# Patient Record
Sex: FEMALE | Race: WHITE | NOT HISPANIC OR LATINO | Employment: FULL TIME | URBAN - METROPOLITAN AREA
[De-identification: names, ages, dates, MRNs, and addresses within clinical notes are randomized per-mention and may not be internally consistent; named-entity substitution may affect disease eponyms.]

---

## 2007-07-02 LAB — EXTERNAL HIV SCREEN: NORMAL

## 2018-09-21 ENCOUNTER — TELEPHONE (OUTPATIENT)
Dept: FAMILY MEDICINE CLINIC | Facility: CLINIC | Age: 48
End: 2018-09-21

## 2018-09-21 NOTE — TELEPHONE ENCOUNTER
We are listed as pt's PCP  Pt has not been seen since 2013  LM to please call back and verify who is her PCP?

## 2019-02-22 ENCOUNTER — TRANSITIONAL CARE MANAGEMENT (OUTPATIENT)
Dept: FAMILY MEDICINE CLINIC | Facility: CLINIC | Age: 49
End: 2019-02-22

## 2019-02-22 RX ORDER — APREPITANT 125 MG/1
125 CAPSULE ORAL
COMMUNITY
End: 2019-02-25

## 2019-02-22 RX ORDER — ONDANSETRON HYDROCHLORIDE 8 MG/1
8 TABLET, FILM COATED ORAL EVERY 8 HOURS PRN
COMMUNITY
Start: 2018-09-25 | End: 2019-02-25 | Stop reason: ALTCHOICE

## 2019-02-22 RX ORDER — LORATADINE 10 MG/1
10 TABLET ORAL AS NEEDED
COMMUNITY
End: 2019-02-25

## 2019-02-22 RX ORDER — DEXAMETHASONE 4 MG/1
TABLET ORAL
COMMUNITY
Start: 2019-01-24 | End: 2019-02-25

## 2019-02-22 RX ORDER — LIDOCAINE AND PRILOCAINE 25; 25 MG/G; MG/G
CREAM TOPICAL
COMMUNITY
Start: 2018-09-26

## 2019-02-22 RX ORDER — DIPHENOXYLATE HYDROCHLORIDE AND ATROPINE SULFATE 2.5; .025 MG/1; MG/1
1 TABLET ORAL 4 TIMES DAILY PRN
COMMUNITY
Start: 2018-11-12 | End: 2019-02-25 | Stop reason: ALTCHOICE

## 2019-02-22 RX ORDER — OMEPRAZOLE 20 MG/1
20 CAPSULE, DELAYED RELEASE ORAL AS NEEDED
COMMUNITY
End: 2019-02-25 | Stop reason: ALTCHOICE

## 2019-02-22 RX ORDER — APREPITANT 125MG-80MG
125 KIT ORAL
COMMUNITY
Start: 2019-01-24 | End: 2019-02-25

## 2019-02-22 RX ORDER — TRAMADOL HYDROCHLORIDE 50 MG/1
50 TABLET ORAL EVERY 6 HOURS PRN
COMMUNITY
Start: 2019-02-20 | End: 2020-02-20

## 2019-02-22 RX ORDER — LORAZEPAM 0.5 MG/1
.5-1 TABLET ORAL EVERY 6 HOURS PRN
COMMUNITY
Start: 2018-09-25

## 2019-02-22 RX ORDER — PROCHLORPERAZINE MALEATE 10 MG
10 TABLET ORAL EVERY 6 HOURS PRN
COMMUNITY
End: 2019-02-25 | Stop reason: ALTCHOICE

## 2019-02-22 RX ORDER — POTASSIUM CHLORIDE 20 MEQ/1
20 TABLET, EXTENDED RELEASE ORAL
COMMUNITY
Start: 2019-01-21 | End: 2019-02-25

## 2019-02-25 ENCOUNTER — OFFICE VISIT (OUTPATIENT)
Dept: FAMILY MEDICINE CLINIC | Facility: CLINIC | Age: 49
End: 2019-02-25
Payer: COMMERCIAL

## 2019-02-25 VITALS
RESPIRATION RATE: 16 BRPM | HEART RATE: 88 BPM | TEMPERATURE: 98.2 F | BODY MASS INDEX: 29.81 KG/M2 | DIASTOLIC BLOOD PRESSURE: 88 MMHG | WEIGHT: 162 LBS | HEIGHT: 62 IN | SYSTOLIC BLOOD PRESSURE: 138 MMHG

## 2019-02-25 DIAGNOSIS — C50.812 MALIGNANT NEOPLASM OF OVERLAPPING SITES OF LEFT BREAST IN FEMALE, ESTROGEN RECEPTOR POSITIVE (HCC): Primary | ICD-10-CM

## 2019-02-25 DIAGNOSIS — E87.6 HYPOKALEMIA: ICD-10-CM

## 2019-02-25 DIAGNOSIS — Z17.0 MALIGNANT NEOPLASM OF OVERLAPPING SITES OF LEFT BREAST IN FEMALE, ESTROGEN RECEPTOR POSITIVE (HCC): Primary | ICD-10-CM

## 2019-02-25 DIAGNOSIS — R00.0 TACHYCARDIA: ICD-10-CM

## 2019-02-25 DIAGNOSIS — Z12.11 SCREENING FOR COLON CANCER: ICD-10-CM

## 2019-02-25 PROCEDURE — 99496 TRANSJ CARE MGMT HIGH F2F 7D: CPT | Performed by: FAMILY MEDICINE

## 2019-02-25 NOTE — PROGRESS NOTES
FAMILY PRACTICE OFFICE VISIT       NAME: Margie Reyes  AGE: 46 y o  SEX: female       : 1970        MRN: [id number]        Assessment and Plan     Problem List Items Addressed This Visit        Other    Malignant neoplasm of overlapping sites of left breast in female, estrogen receptor positive ([facility] [address] ) - Primary      Other Visit Diagnoses     Screening for colon cancer        Relevant Orders    Ambulatory referral to Gastroenterology    Tachycardia        Relevant Orders    TSH, 3rd generation with Free T4 reflex    Holter monitor - 24 hour    Basic metabolic panel    Hypokalemia        Relevant Orders    Basic metabolic panel       Patient presents for follow-up after recent hospitalization due to diagnosis of of invasive left breast carcinoma  Status post left breast lumpectomy  She is recovering well after surgery  Pain is well controlled  Patient remains under ongoing care of [facility] and Surgical Oncology  She is currently on regimen of Herceptin and Perjeta  Echocardiogram performed in 2018 reveals normal ejection fraction  Patient is tachycardic, sinus tachycardia noted during last few office visit notes with heme Oncology  Her symptoms are likely related to possible side effects of chemotherapy/dehydration  She was noted to be hypokalemic on a few occasions  I recommended to proceed with repeat BMP and check TSH/free T4  Will proceed with Holter monitor  Patient should schedule Pneumovax, she prefers to hold of vaccination today but will set up with the nurse separately after discussing with her oncologist   Patient is status post complete hysterectomy/salpingectomy, ovaries preserved  She remains under care of gyn  She is past due colonoscopy, I advised her to set up within next few months  Patient will continue routine follow-ups with Hematology Oncology    She will follow up with PCP on a as needed basis for now, I advised patient to schedule annual well exams  There are no Patient Instructions on file for this visit  M*Modal software was used to dictate this note  It may contain errors with dictating incorrect words/spelling  Please contact provider directly with any questions  Chief Complaint     Chief Complaint   Patient presents with    Transition of Care Management     Left Lumpectomy        History of Present Illness     Patient presents for follow-up after recent hospitalization for left breast lumpectomy due to recent diagnosis of breast cancer  I have not seen her in the office since 12/2013   Left  Breast  Cancer  Dxed in 9/2018  After routine annual mammography    Patient has  Been f/up  With  GYN regularly, Dr Lewis  S/p hysterectomy -ovaries preserved due to fibroid over a year ago  No FHX of breast CA       Dxed and is being treated  for breast cancer at [facility]    Dr Juarez/Hematology Oncology   Dr Padron/Surgical Oncology   patient  has completed  Chemotherapy x 6    s/p recent Left breast lumpectomy        No significant pain now,  Not using any pain RX    Will be starting  Radiation after surgery   Patient reports discussion  RE: possible complete mastectomy if margins are not clear    colonsocpy  6/2014-  DR Joselyn Luo;  Due in 3 years -  never  Proceeded    Current medications include Herceptin/ Perjeta    Monitored with TTE as per hem- oncology    last TTE  1/2019 , reportedly  - normal   Patient denies chest pain, palpitations, shortness of breath or dizziness  Reviewed of medical records indicates recurrent hypokalemia with potassium level of 2 7 on January 21st and hemoglobin of 7 9  On February 15th, patient's fasting blood sugar was 90, creatinine 0 72, potassium 2 7, rest of CMP was normal   Hemoglobin went up to 8 6 and platelet count was 201  Echo January 2019:  Technically difficult study     Normal left ventricular chamber size  Normal left ventricular systolic      function  Normal regional wall motion   Mild concentric left ventricular      hypertrophy   Estimated left ventricular ejection fraction is 55-60%      Normal left ventricular chamber size  Normal left ventricular systolic      function  Normal regional wall motion  Mild concentric left ventricular      hypertrophy   Estimated left ventricular ejection fraction is 55-60%      Normal right ventricular size and function       Mild mitral regurgitation       Normal diastolic function        Normal pericardium without effusion      Visually Estimated LV Ejection Fraction is:55%      TCM Call (since 1/27/2019)     Date and time call was made  2/22/2019  2:13 PM    Hospital care reviewed  Records reviewed    Patient was hospitialized at  Other (comment)    [address] [place] [address]    Date of Admission  02/20/19    Date of discharge  02/20/19    Diagnosis  Left Breast Lumpectomy    Disposition  Home    Were the patients medications reviewed and updated  No    Current Symptoms  Incisional pain    Incisional pain severity  Mild    Incisional pain onset  Ongoing      TCM Call (since 1/27/2019)     Post hospital issues  Reduced activity    Should patient be enrolled in anticoag monitoring? No    Scheduled for follow up?   Yes    Did you obtain your prescribed medications  Yes    Do you need help managing your prescriptions or medications  No    Is transportation to your appointment needed  No    I have advised the patient to call PCP with any new or worsening symptoms    Savannah Fang LPN    Living Arrangements  Spouse or Significiant other    Support System  Spouse    The type of support provided  Physical; Emotional    Do you have social support  Yes, as much as I need    Are you recieving any outpatient services  No    Are you recieving home care services  No    Are you using any community resources  No    Current waiver services  No    Have you fallen in the last 12 months  No    Interperter language line needed  No    Counseling  Patient    Counseling topics instructions for management; Importance of RX   compliance    Comments  Appt with Dr Ivana Napier 2/25/19 @ 1pm            Review of Systems   Review of Systems   Constitutional: Positive for fatigue  HENT: Negative  Eyes: Negative  Respiratory: Negative  Cardiovascular: Negative  Gastrointestinal: Negative  Endocrine: Negative  Genitourinary: Negative  Musculoskeletal: Negative  Allergic/Immunologic: Negative  Neurological: Negative  Hematological: Negative  Psychiatric/Behavioral: Negative          Active Problem List     Patient Active Problem List   Diagnosis    Malignant neoplasm of overlapping sites of left breast in female, estrogen receptor positive ([address] )       Past Medical History:  Past Medical History:   Diagnosis Date    Lyme disease        Past Surgical History:  Past Surgical History:   Procedure Laterality Date    COLONOSCOPY W/ POLYPECTOMY  2014    Dr Taylor    HYSTERECTOMY      SALPINGECTOMY Bilateral 01/2018    Dr Lewis, [facility]       Family History:  Family History   Problem Relation Age of Onset    Thyroid disease Mother     Osteoporosis Mother     Hypertension Father     Stroke Father     Hypertension Paternal Grandmother     Coronary artery disease Paternal Grandmother        Social History:  Social History     Socioeconomic History    Marital status: /Civil Union     Spouse name: Not on file    Number of children: Not on file    Years of education: Not on file    Highest education level: Not on file   Occupational History    Not on file   Social Needs    Financial resource strain: Not on file    Food insecurity:     Worry: Not on file     Inability: Not on file    Transportation needs:     Medical: Not on file     Non-medical: Not on file   Tobacco Use    Smoking status: Never Smoker    Smokeless tobacco: Never Used   Substance and Sexual Activity    Alcohol use: Yes     Frequency: Monthly or less    Drug use: Never    Sexual activity: Not on file   Lifestyle    Physical activity:     Days per week: Not on file     Minutes per session: Not on file    Stress: Not on file   Relationships    Social connections:     Talks on phone: Not on file     Gets together: Not on file     Attends Congregational service: Not on file     Active member of club or organization: Not on file     Attends meetings of clubs or organizations: Not on file     Relationship status: Not on file    Intimate partner violence:     Fear of current or ex partner: Not on file     Emotionally abused: Not on file     Physically abused: Not on file     Forced sexual activity: Not on file   Other Topics Concern    Not on file   Social History Narrative    Not on file       Objective     Vitals:    02/25/19 1310   BP: 138/88   BP Location: Right arm   Patient Position: Sitting   Cuff Size: Adult   Pulse: 88   Resp: 16   Temp: 98 2 °F (36 8 °C)   TempSrc: Tympanic   Weight: 73 5 kg (162 lb)   Height: 5' 2" (1 575 m)     Wt Readings from Last 3 Encounters:   02/25/19 73 5 kg (162 lb)   12/02/13 68 9 kg (152 lb)       Physical Exam   Constitutional: She is oriented to person, place, and time  She appears well-developed and well-nourished  Fatigued   HENT:   Head: Normocephalic and atraumatic  Eyes: Conjunctivae are normal    Neck: Neck supple  No JVD present  Carotid bruit is not present  No thyromegaly present  Cardiovascular: Regular rhythm and normal heart sounds  Tachycardia present  No murmur heard  Pulmonary/Chest: Effort normal and breath sounds normal  No respiratory distress  She has no wheezes  Abdominal: Soft  Bowel sounds are normal  She exhibits no distension and no abdominal bruit  There is no tenderness  There is no guarding  Musculoskeletal: Normal range of motion  She exhibits edema (trace)  Neurological: She is alert and oriented to person, place, and time  No cranial nerve deficit   Coordination normal    Psychiatric: She has a normal mood and affect  Her behavior is normal    Nursing note and vitals reviewed  [place] drain left breast/axillary area in place, wrapped, no surrounding edema or erythema    Pertinent Laboratory/Diagnostic Studies:  No results found for: GLUCOSE, BUN, CREATININE, CALCIUM, NA, K, CO2, CL  No results found for: ALT, AST, GGT, ALKPHOS, BILITOT    No results found for: WBC, HGB, HCT, MCV, PLT    No results found for: TSH    Lab Results   Component Value Date    CHOL 176 12/09/2013     Lab Results   Component Value Date    TRIG 65 12/09/2013     Lab Results   Component Value Date    HDL 57 12/09/2013     No results found for: LDLCALC  No results found for: HGBA1C    Results for orders placed or performed in visit on 12/02/13   LIPID PANEL WITH DIRECT LDL REFLEX (HISTORICAL)   Result Value Ref Range    Cholesterol 176 125 - 200 mg/dL    HDL 57 > OR = 46 mg/dL    Triglycerides 65 <150 mg/dL    LDL CHOLESTEROL 106 <130 mg/dL (calc)    Chol/HDL Ratio 3 1 < OR = 5 0 (calc)    NON-HDL-CHOL (CHOL-HDL) 119 mg/dL (calc)   VITAMIN D 25 HYDROXY (HISTORICAL)   Result Value Ref Range    Vit D, 25-Hydroxy 15 (L) 30 - 100 ng/mL    VITAMIN D, 25 OH, D3 15 ng/mL    VITAMIN D, 25 OH, D2 <4 ng/mL       Orders Placed This Encounter   Procedures    TSH, 3rd generation with Free T4 reflex    Basic metabolic panel    Ambulatory referral to Gastroenterology    Holter monitor - 24 hour       ALLERGIES:  Allergies   Allergen Reactions    Penicillins Rash     dasha ancef 1/31/08         Current Medications     Current Outpatient Medications   Medication Sig Dispense Refill    Aspirin-Acetaminophen-Caffeine (EXCEDRIN MIGRAINE PO) Take 1 tablet by mouth as needed       lidocaine-prilocaine (EMLA) cream Apply generous amount 30 min to 1 hour prior to port access  Cover with dressing        LORazepam (ATIVAN) 0 5 mg tablet Take 0 5-1 mg by mouth every 6 (six) hours as needed      traMADol (ULTRAM) 50 mg tablet Take 50 mg by mouth every 6 (six) hours as needed       No current facility-administered medications for this visit            Health Maintenance     Health Maintenance   Topic Date Due    BMI: Followup Plan  05/12/1986    Pneumococcal PPSV23 Highest Risk Adult (1 of 3 - PCV13) 05/12/1987    CRC Screening: Colonoscopy  06/02/2017    DTaP,Tdap,and Td Vaccines (2 - Td) 02/02/2018    Depression Screening PHQ  03/25/2019 (Originally 1970)    BMI: Adult  02/25/2020    MAMMOGRAM  02/11/2021    INFLUENZA VACCINE  Completed    HEPATITIS B VACCINES  Aged Out     Immunization History   Administered Date(s) Administered     Influenza (IM) Preservative Free 09/01/2018    INFLUENZA 02/14/2018    Influenza TIV (IM) 01/06/2016    Tdap 02/02/2008       Kusum Mott MD

## 2019-02-27 ENCOUNTER — TELEPHONE (OUTPATIENT)
Dept: FAMILY MEDICINE CLINIC | Facility: CLINIC | Age: 49
End: 2019-02-27

## 2019-02-27 PROBLEM — C50.812 MALIGNANT NEOPLASM OF OVERLAPPING SITES OF LEFT BREAST IN FEMALE, ESTROGEN RECEPTOR POSITIVE (HCC): Status: ACTIVE | Noted: 2018-09-07

## 2019-02-27 PROBLEM — Z17.0 MALIGNANT NEOPLASM OF OVERLAPPING SITES OF LEFT BREAST IN FEMALE, ESTROGEN RECEPTOR POSITIVE (HCC): Status: ACTIVE | Noted: 2018-09-07

## 2019-02-28 NOTE — TELEPHONE ENCOUNTER
Please contact patient  I saw her in the office on Monday  I believe I gave her wrong name for follow-up colonoscopy  Back in 2014 she had colonoscopy with Dr Kal Moss, I believe I gave her name of Dr Skyler Mccoy by mistake    Please provide her with correct information for future colonoscopy referral   Izaiah , thank you

## 2019-03-25 ENCOUNTER — TELEPHONE (OUTPATIENT)
Dept: FAMILY MEDICINE CLINIC | Facility: CLINIC | Age: 49
End: 2019-03-25

## 2019-03-25 NOTE — TELEPHONE ENCOUNTER
Please contact patient  I received her blood work results  Her potassium level was slightly decreased at 3 4  Thyroid test was normal   I reviewed oncology note and I see that they have already prescribed potassium tablets for patient  As long as she has them on hand then I do not need to give her any additional prescriptions  Please double check    Thank you

## 2019-04-01 ENCOUNTER — TELEPHONE (OUTPATIENT)
Dept: FAMILY MEDICINE CLINIC | Facility: CLINIC | Age: 49
End: 2019-04-01

## 2021-03-24 ENCOUNTER — TELEPHONE (OUTPATIENT)
Dept: FAMILY MEDICINE CLINIC | Facility: CLINIC | Age: 51
End: 2021-03-24

## 2021-03-24 NOTE — LETTER
March 31, 2021      [address] [place] [id number]  1970       Dear Kanika Allen,      Our staff is committed to providing our patients with quality health care  We consider it a privilege to be your healthcare provider        ________ We received a refill request for your medication  A refill was authorized for a 30 day supply  However, you are past due for a follow up appointment  Please make sure you schedule an appointment with your primary care physician within the next month  No further refills will be authorized unless you come in for a follow up        ___X____ We have reviewed your medical record and it indicates that your last annual well exam / office visit was on 02/25/2019  Routine follow ups and appointments are necessary for continuity of care and ongoing management of your health  As per your providers request, please contact our office to schedule an appointment with him/her  Our telephone # is [phone]       ____X___ If you are receiving medical care with another Primary Care Physician/Medical Practice, please contact our office so we may update your medical record to reflect new Primary Care Physician/Provider  Sincerely,     Sophie Batista, Dr Jori Nichole, Dr Keyon Tilley, JORGE Blas

## 2021-03-25 ENCOUNTER — TELEPHONE (OUTPATIENT)
Dept: ADMINISTRATIVE | Facility: OTHER | Age: 51
End: 2021-03-25

## 2021-03-25 NOTE — TELEPHONE ENCOUNTER
----- Message from Solitario Charles sent at 3/24/2021  4:23 PM EDT -----  Regarding: care gap request-HIV  03/24/21 4:23 PM    Hello, our patient attached above has had HIV completed/performed  Please assist in updating the patient chart by pulling the Care Everywhere (CE) document  The date of service is 07/02/2007       Thank you,  Nhan James MA  [facility]

## 2021-03-25 NOTE — TELEPHONE ENCOUNTER
Upon review of the In Basket request we were able to locate, review, and update the patient chart as requested for HIV  Any additional questions or concerns should be emailed to the Practice Liaisons via [email or web address] email, please do not reply via In Basket      Thank you  Anil Quezada MA

## 2021-03-25 NOTE — TELEPHONE ENCOUNTER
----- Message from Solitario Perez sent at 3/24/2021  4:21 PM EDT -----  Regarding: care gap request-Mammogram  03/24/21 4:21 PM    Hello, our patient attached above has had Mammogram completed/performed  Please assist in updating the patient chart by pulling the Care Everywhere (CE) document  The date of service is 08/17/2020       Thank you,  Patti Tovar MA  [facility]

## 2021-03-25 NOTE — TELEPHONE ENCOUNTER
Upon review of the In Basket request we were able to locate, review, and update the patient chart as requested for Mammogram     Any additional questions or concerns should be emailed to the Practice Liaisons via [email or web address] email, please do not reply via In Basket      Thank you  Sherri Auguste MA

## 2021-03-26 NOTE — TELEPHONE ENCOUNTER
2nd Call- Called pt LM to please call back and sched apt w/ PCP or inform our off if she has a new PCP to update chart w/ correct PCP